# Patient Record
Sex: MALE | Race: WHITE | NOT HISPANIC OR LATINO | Employment: OTHER | RURAL
[De-identification: names, ages, dates, MRNs, and addresses within clinical notes are randomized per-mention and may not be internally consistent; named-entity substitution may affect disease eponyms.]

---

## 2022-09-06 ENCOUNTER — OFFICE VISIT (OUTPATIENT)
Dept: OTOLARYNGOLOGY | Facility: CLINIC | Age: 72
End: 2022-09-06
Payer: MEDICARE

## 2022-09-06 VITALS — HEIGHT: 69 IN | BODY MASS INDEX: 25.92 KG/M2 | WEIGHT: 175 LBS

## 2022-09-06 DIAGNOSIS — R04.0 EPISTAXIS: Primary | ICD-10-CM

## 2022-09-06 PROCEDURE — 99204 OFFICE O/P NEW MOD 45 MIN: CPT | Mod: S$PBB,,, | Performed by: OTOLARYNGOLOGY

## 2022-09-06 PROCEDURE — 99204 PR OFFICE/OUTPT VISIT, NEW, LEVL IV, 45-59 MIN: ICD-10-PCS | Mod: S$PBB,,, | Performed by: OTOLARYNGOLOGY

## 2022-09-06 PROCEDURE — 99203 OFFICE O/P NEW LOW 30 MIN: CPT | Mod: PBBFAC | Performed by: OTOLARYNGOLOGY

## 2022-09-06 NOTE — PROGRESS NOTES
Subjective:       Patient ID: Jaleel Aguirre is a 72 y.o. male.    Chief Complaint: Epistaxis (Right nostril packed early this am by [facility]. Wife states patient's nose started bleeding during the night, state he does take plavix and 81 mg ASA. )    Epistaxis     Review of Systems   HENT:  Positive for nosebleeds.    All other systems reviewed and are negative.    Objective:      Physical Exam  General: NAD  Head: Normocephalic, atraumatic, no facial asymmetry/normal strength,  Ears: Both auricules normal in appearance, w/o deformities tympanic membranes normal external auditory canals normal  Nose: External nose w/o deformities Packing in place bilaterally will leave for 2 days  Oral Cavity: Lips, gums, floor of mouth, tongue hard palate, and buccal mucosa without mass/lesion  Oropharynx: Mucosa pink and moist, soft palate, posterior pharynx and oropharyngeal wall without mass/lesion  Neck: Supple, symmetric, trachea midline, no palpable mass/lesion, no palpable cervical lymphadenopathy  Skin: Warm and dry, no concerning lesions  Respiratory: Respirations even, unlabored   Assessment:       1. Epistaxis        Plan:       Stay off blood thinners   F/u 2 days

## 2022-09-09 ENCOUNTER — OFFICE VISIT (OUTPATIENT)
Dept: OTOLARYNGOLOGY | Facility: CLINIC | Age: 72
End: 2022-09-09
Payer: MEDICARE

## 2022-09-09 VITALS — HEIGHT: 69 IN | WEIGHT: 175 LBS | BODY MASS INDEX: 25.92 KG/M2

## 2022-09-09 DIAGNOSIS — R04.0 EPISTAXIS: Primary | ICD-10-CM

## 2022-09-09 PROCEDURE — 30901 PR CTRL 2SEBLEED,ANTER,SIMPLE: ICD-10-PCS | Mod: S$PBB,RT,, | Performed by: OTOLARYNGOLOGY

## 2022-09-09 PROCEDURE — 99213 OFFICE O/P EST LOW 20 MIN: CPT | Mod: PBBFAC | Performed by: OTOLARYNGOLOGY

## 2022-09-09 PROCEDURE — 99499 UNLISTED E&M SERVICE: CPT | Mod: S$PBB,,, | Performed by: OTOLARYNGOLOGY

## 2022-09-09 PROCEDURE — 30901 CONTROL OF NOSEBLEED: CPT | Mod: PBBFAC,RT | Performed by: OTOLARYNGOLOGY

## 2022-09-09 PROCEDURE — 30901 CONTROL OF NOSEBLEED: CPT | Mod: S$PBB,RT,, | Performed by: OTOLARYNGOLOGY

## 2022-09-09 PROCEDURE — 99499 NO LOS: ICD-10-PCS | Mod: S$PBB,,, | Performed by: OTOLARYNGOLOGY

## 2022-09-09 NOTE — PROGRESS NOTES
Patient ID: Jaleel Aguirre is a 72 y.o. male.    Chief Complaint: Epistaxis (Right nostril packed. Needs packing removed. )    Epistaxis     Review of Systems   HENT:  Positive for congestion and nosebleeds.    All other systems reviewed and are negative.    Objective:      Physical Exam  General: NAD  Head: Normocephalic, atraumatic, no facial asymmetry/normal strength,  Ears: Both auricules normal in appearance, w/o deformities tympanic membranes normal external auditory canals normal  Nose: External nose w/o deformities packing removed right nose cauterized   Oral Cavity: Lips, gums, floor of mouth, tongue hard palate, and buccal mucosa without mass/lesion  Oropharynx: Mucosa pink and moist, soft palate, posterior pharynx and oropharyngeal wall without mass/lesion  Neck: Supple, symmetric, trachea midline, no palpable mass/lesion, no palpable cervical lymphadenopathy  Skin: Warm and dry, no concerning lesions  Respiratory: Respirations even, unlabored      The nasal cavity was anesthetized  The prominent vessels on the right upper nasal septum were cauterized with silver nitrate without impacting the inferior turbinate at the same point so as to minimize the risk of synechiae formation. The patient tolerated this procedure well without complication. The patient was counseled that there may be some drainage of black or grey material over the next couple of days - this is normal and reflects the composition of the cautery agent which was noted to be silver nitrate.       Assessment:       1. Epistaxis        Plan:       F/u prn start back meds tomorrow

## 2023-10-18 DIAGNOSIS — F03.90 DEMENTIA: Primary | ICD-10-CM

## 2023-10-31 RX ORDER — CLOPIDOGREL BISULFATE 75 MG/1
75 TABLET ORAL DAILY
COMMUNITY
Start: 2023-10-03

## 2023-10-31 RX ORDER — QUETIAPINE FUMARATE 25 MG/1
25 TABLET, FILM COATED ORAL DAILY
COMMUNITY
Start: 2023-10-16

## 2023-10-31 RX ORDER — MEMANTINE HYDROCHLORIDE 10 MG/1
10 TABLET ORAL DAILY
COMMUNITY
Start: 2023-09-30

## 2023-10-31 RX ORDER — TEMAZEPAM 15 MG/1
15 CAPSULE ORAL DAILY
COMMUNITY
Start: 2023-08-24

## 2023-10-31 RX ORDER — LOSARTAN POTASSIUM 100 MG/1
100 TABLET ORAL DAILY
COMMUNITY
Start: 2023-10-02

## 2023-10-31 RX ORDER — ISOSORBIDE MONONITRATE 30 MG/1
15 TABLET, EXTENDED RELEASE ORAL DAILY
COMMUNITY
Start: 2023-08-15

## 2023-10-31 RX ORDER — ATORVASTATIN CALCIUM 40 MG/1
40 TABLET, FILM COATED ORAL DAILY
COMMUNITY
Start: 2023-10-02

## 2023-10-31 RX ORDER — VENLAFAXINE HYDROCHLORIDE 75 MG/1
75 CAPSULE, EXTENDED RELEASE ORAL DAILY
COMMUNITY
Start: 2023-09-03

## 2023-10-31 RX ORDER — EZETIMIBE 10 MG/1
10 TABLET ORAL DAILY
COMMUNITY
Start: 2023-10-02

## 2023-10-31 RX ORDER — RIVASTIGMINE TARTRATE 4.5 MG/1
4.5 CAPSULE ORAL 2 TIMES DAILY
COMMUNITY
Start: 2023-08-07

## 2023-10-31 RX ORDER — METOPROLOL SUCCINATE 25 MG/1
12.5 TABLET, EXTENDED RELEASE ORAL DAILY
COMMUNITY
Start: 2023-08-03

## 2023-11-01 ENCOUNTER — OFFICE VISIT (OUTPATIENT)
Dept: NEUROLOGY | Facility: CLINIC | Age: 73
End: 2023-11-01
Payer: MEDICARE

## 2023-11-01 VITALS
DIASTOLIC BLOOD PRESSURE: 76 MMHG | HEART RATE: 97 BPM | SYSTOLIC BLOOD PRESSURE: 142 MMHG | OXYGEN SATURATION: 98 % | BODY MASS INDEX: 25.92 KG/M2 | HEIGHT: 69 IN | TEMPERATURE: 98 F | RESPIRATION RATE: 18 BRPM | WEIGHT: 175 LBS

## 2023-11-01 DIAGNOSIS — F03.90 DEMENTIA, UNSPECIFIED DEMENTIA SEVERITY, UNSPECIFIED DEMENTIA TYPE, UNSPECIFIED WHETHER BEHAVIORAL, PSYCHOTIC, OR MOOD DISTURBANCE OR ANXIETY: Primary | ICD-10-CM

## 2023-11-01 DIAGNOSIS — R41.3 OTHER AMNESIA: ICD-10-CM

## 2023-11-01 PROCEDURE — 99204 PR OFFICE/OUTPT VISIT, NEW, LEVL IV, 45-59 MIN: ICD-10-PCS | Mod: S$PBB,,, | Performed by: PSYCHIATRY & NEUROLOGY

## 2023-11-01 PROCEDURE — 99215 OFFICE O/P EST HI 40 MIN: CPT | Mod: PBBFAC | Performed by: PSYCHIATRY & NEUROLOGY

## 2023-11-01 PROCEDURE — 99204 OFFICE O/P NEW MOD 45 MIN: CPT | Mod: S$PBB,,, | Performed by: PSYCHIATRY & NEUROLOGY

## 2023-11-01 NOTE — PATIENT INSTRUCTIONS
MRI brain w/contrast   Cont Namenda 10 mg bid and Rivastigmine 4.5 mg   Cont control risk factors   Excellent family support network from wife   F/u 3 months

## 2023-11-01 NOTE — PROGRESS NOTES
Subjective:       Patient ID: Jaleel Aguirre is a 73 y.o. male     Chief Complaint:    Chief Complaint   Patient presents with    Dementia        Allergies:  Patient has no known allergies.    Current Medications:    Outpatient Encounter Medications as of 11/1/2023   Medication Sig Dispense Refill    atorvastatin (LIPITOR) 40 MG tablet Take 40 mg by mouth once daily.      clopidogreL (PLAVIX) 75 mg tablet Take 75 mg by mouth once daily.      ezetimibe (ZETIA) 10 mg tablet Take 10 mg by mouth once daily.      isosorbide mononitrate (IMDUR) 30 MG 24 hr tablet Take 15 mg by mouth once daily.      losartan (COZAAR) 100 MG tablet Take 100 mg by mouth once daily.      memantine (NAMENDA) 10 MG Tab Take 10 mg by mouth once daily.      metoprolol succinate (TOPROL-XL) 25 MG 24 hr tablet Take 12.5 mg by mouth once daily.      QUEtiapine (SEROQUEL) 25 MG Tab Take 25 mg by mouth once daily.      rivastigmine tartrate 4.5 mg capsule Take 4.5 mg by mouth 2 (two) times daily.      temazepam (RESTORIL) 15 mg Cap Take 15 mg by mouth once daily.      venlafaxine (EFFEXOR-XR) 75 MG 24 hr capsule Take 75 mg by mouth once daily.       No facility-administered encounter medications on file as of 11/1/2023.       History of Present Illness  74 yo WM escorted by wife referred for dementia for past few years  prev seen by Dr Martinez and put on Namenda 10 mg bid Rivastigmine 4.5 mg bid - he nor wife can recall if he was ever on Aricept ?   Also on Effexor and Seroquel for hallucinations and sundowning at times   Pt was having signif short term memory issues   Pt handles most ADL's well but no driving and wife handles finance and legal issues   His dementia is fairly significant - will get fresh Mri brain   No FH of dementia per wife            History reviewed. No pertinent past medical history.    History reviewed. No pertinent surgical history.    Social History  Mr. Aguirre  reports that he has never smoked. He has never used smokeless  "tobacco.    Family History  Mr.'s Aguirre family history is not on file.    Review of Systems  Review of Systems   Psychiatric/Behavioral:  Positive for depression and memory loss.    All other systems reviewed and are negative.     Objective:   BP (!) 142/76 (BP Location: Left arm, Patient Position: Sitting, BP Method: Large (Automatic))   Pulse 97   Temp 97.6 °F (36.4 °C) (Temporal)   Resp 18   Ht 5' 9" (1.753 m)   Wt 79.4 kg (175 lb)   SpO2 98%   BMI 25.84 kg/m²    NEUROLOGICAL EXAMINATION:     MENTAL STATUS   Oriented to person.   Disoriented to place. Oriented to country and city.   Disoriented to year, month and date.   Registration: recalls 1 of 3 objects.   Attention: decreased. Concentration: decreased.   Speech: speech is normal   Level of consciousness: alert  Knowledge: consistent with education.        signif dementia  Poor serial 7's   Did not know POTUS       CRANIAL NERVES   Cranial nerves II through XII intact.     MOTOR EXAM     Strength   Strength 5/5 throughout.     GAIT AND COORDINATION     Gait  Gait: normal       Physical Exam  Vitals reviewed.   Constitutional:       Appearance: He is normal weight.   Neurological:      General: No focal deficit present.      Mental Status: He is alert. Mental status is at baseline.      Cranial Nerves: Cranial nerves 2-12 are intact.      Motor: Motor strength is normal.     Gait: Gait is intact.   Psychiatric:         Speech: Speech normal.          Assessment:     Dementia, unspecified dementia severity, unspecified dementia type, unspecified whether behavioral, psychotic, or mood disturbance or anxiety  -     Creatinine, serum; Future; Expected date: 11/01/2023    Other amnesia  -     MRI Brain W WO Contrast; Future; Expected date: 11/01/2023         Primary Diagnosis and ICD10  Dementia, unspecified dementia severity, unspecified dementia type, unspecified whether behavioral, psychotic, or mood disturbance or anxiety [F03.90]    Plan:     Patient " Instructions   Cont Namenda 10 mg bid   Will consider adding Aricept if feels can tolerate   Cont control risk factors   Excellent family support network from wife   F/u 6 months       There are no discontinued medications.    Requested Prescriptions      No prescriptions requested or ordered in this encounter

## 2023-11-15 ENCOUNTER — HOSPITAL ENCOUNTER (OUTPATIENT)
Dept: RADIOLOGY | Facility: HOSPITAL | Age: 73
Discharge: HOME | End: 2023-11-15
Attending: PSYCHIATRY & NEUROLOGY
Payer: MEDICARE

## 2023-11-15 DIAGNOSIS — R41.3 OTHER AMNESIA: ICD-10-CM

## 2023-11-15 PROCEDURE — A9577 INJ MULTIHANCE: HCPCS | Performed by: PSYCHIATRY & NEUROLOGY

## 2023-11-15 PROCEDURE — 70553 MRI BRAIN STEM W/O & W/DYE: CPT | Mod: 26,,, | Performed by: STUDENT IN AN ORGANIZED HEALTH CARE EDUCATION/TRAINING PROGRAM

## 2023-11-15 PROCEDURE — 25500020 PHARM REV CODE 255: Performed by: PSYCHIATRY & NEUROLOGY

## 2023-11-15 PROCEDURE — 70553 MRI BRAIN STEM W/O & W/DYE: CPT | Mod: TC

## 2023-11-15 PROCEDURE — 70553 MRI BRAIN W WO CONTRAST: ICD-10-PCS | Mod: 26,,, | Performed by: STUDENT IN AN ORGANIZED HEALTH CARE EDUCATION/TRAINING PROGRAM

## 2023-11-15 RX ADMIN — GADOBENATE DIMEGLUMINE 15 ML: 529 INJECTION, SOLUTION INTRAVENOUS at 09:11

## 2024-02-01 ENCOUNTER — OFFICE VISIT (OUTPATIENT)
Dept: NEUROLOGY | Facility: CLINIC | Age: 74
End: 2024-02-01
Payer: MEDICARE

## 2024-02-01 VITALS
SYSTOLIC BLOOD PRESSURE: 132 MMHG | HEIGHT: 69 IN | RESPIRATION RATE: 18 BRPM | OXYGEN SATURATION: 96 % | HEART RATE: 67 BPM | WEIGHT: 175 LBS | BODY MASS INDEX: 25.92 KG/M2 | DIASTOLIC BLOOD PRESSURE: 62 MMHG

## 2024-02-01 DIAGNOSIS — F03.90 DEMENTIA, UNSPECIFIED DEMENTIA SEVERITY, UNSPECIFIED DEMENTIA TYPE, UNSPECIFIED WHETHER BEHAVIORAL, PSYCHOTIC, OR MOOD DISTURBANCE OR ANXIETY: Primary | ICD-10-CM

## 2024-02-01 PROCEDURE — 99214 OFFICE O/P EST MOD 30 MIN: CPT | Mod: S$PBB,,, | Performed by: PSYCHIATRY & NEUROLOGY

## 2024-02-01 PROCEDURE — 99215 OFFICE O/P EST HI 40 MIN: CPT | Mod: PBBFAC | Performed by: PSYCHIATRY & NEUROLOGY

## 2024-02-01 NOTE — PROGRESS NOTES
Subjective:       Patient ID: Jaleel Aguirre is a 73 y.o. male     Chief Complaint:    Chief Complaint   Patient presents with    Dementia        Allergies:  Patient has no known allergies.    Current Medications:    Outpatient Encounter Medications as of 2/1/2024   Medication Sig Dispense Refill    atorvastatin (LIPITOR) 40 MG tablet Take 40 mg by mouth once daily.      clopidogreL (PLAVIX) 75 mg tablet Take 75 mg by mouth once daily.      ezetimibe (ZETIA) 10 mg tablet Take 10 mg by mouth once daily.      isosorbide mononitrate (IMDUR) 30 MG 24 hr tablet Take 15 mg by mouth once daily.      losartan (COZAAR) 100 MG tablet Take 100 mg by mouth once daily.      memantine (NAMENDA) 10 MG Tab Take 10 mg by mouth once daily.      metoprolol succinate (TOPROL-XL) 25 MG 24 hr tablet Take 12.5 mg by mouth once daily.      QUEtiapine (SEROQUEL) 25 MG Tab Take 25 mg by mouth once daily.      rivastigmine tartrate 4.5 mg capsule Take 4.5 mg by mouth 2 (two) times daily.      temazepam (RESTORIL) 15 mg Cap Take 15 mg by mouth once daily.      venlafaxine (EFFEXOR-XR) 75 MG 24 hr capsule Take 75 mg by mouth once daily.       No facility-administered encounter medications on file as of 2/1/2024.       History of Present Illness  74 yo WM w/ signif dementia worsened over last few years - excellent family support from wife   Tolerating namenda and exelon well  Sleeping through night but occass vivid dreams   Seroquel bid has helped w/ agitation /irritation          History reviewed. No pertinent past medical history.    History reviewed. No pertinent surgical history.    Social History  Mr. Aguirre  reports that he has never smoked. He has never used smokeless tobacco.    Family History  Mr.'s Aguirre family history is not on file.    Review of Systems  Review of Systems   Psychiatric/Behavioral:  Positive for depression and memory loss. The patient is nervous/anxious and has insomnia.    All other systems reviewed and are  "negative.     Objective:   /62 (BP Location: Right arm, Patient Position: Sitting, BP Method: Large (Manual))   Pulse 67   Resp 18   Ht 5' 9" (1.753 m)   Wt 79.4 kg (175 lb)   SpO2 96%   BMI 25.84 kg/m²    NEUROLOGICAL EXAMINATION:     MENTAL STATUS   Oriented to person.   Oriented to place. Oriented to country and city.   Disoriented to year, month and date.   Registration: recalls 1 of 3 objects.   Level of consciousness: alert  Knowledge: good.        Signif dementia  Did not know POTUS  Poor serial 7's     CRANIAL NERVES   Cranial nerves II through XII intact.     MOTOR EXAM     Strength   Strength 5/5 throughout.     GAIT AND COORDINATION     Gait  Gait: normal       Physical Exam  Vitals reviewed.   Constitutional:       Appearance: He is normal weight.   Neurological:      General: No focal deficit present.      Mental Status: He is alert. Mental status is at baseline.      Cranial Nerves: Cranial nerves 2-12 are intact.      Motor: Motor strength is normal.     Gait: Gait is intact.          Assessment:     There are no diagnoses linked to this encounter.     Primary Diagnosis and ICD10  No primary diagnosis found.    Plan:     There are no Patient Instructions on file for this visit.    There are no discontinued medications.    Requested Prescriptions      No prescriptions requested or ordered in this encounter       "